# Patient Record
Sex: MALE | Race: WHITE | NOT HISPANIC OR LATINO | Employment: FULL TIME | RURAL
[De-identification: names, ages, dates, MRNs, and addresses within clinical notes are randomized per-mention and may not be internally consistent; named-entity substitution may affect disease eponyms.]

---

## 2020-01-31 ENCOUNTER — OFFICE VISIT (OUTPATIENT)
Dept: FAMILY MEDICINE CLINIC | Facility: CLINIC | Age: 30
End: 2020-01-31

## 2020-01-31 VITALS
OXYGEN SATURATION: 98 % | TEMPERATURE: 98.3 F | HEIGHT: 71 IN | RESPIRATION RATE: 19 BRPM | HEART RATE: 81 BPM | DIASTOLIC BLOOD PRESSURE: 70 MMHG | BODY MASS INDEX: 28.17 KG/M2 | SYSTOLIC BLOOD PRESSURE: 136 MMHG | WEIGHT: 201.2 LBS

## 2020-01-31 DIAGNOSIS — M54.50 LOW BACK PAIN, UNSPECIFIED BACK PAIN LATERALITY, UNSPECIFIED CHRONICITY, UNSPECIFIED WHETHER SCIATICA PRESENT: Primary | ICD-10-CM

## 2020-01-31 PROBLEM — E88.09 HYPERPROTEINEMIA: Status: ACTIVE | Noted: 2020-01-31

## 2020-01-31 PROBLEM — R11.10 VOMITING: Status: ACTIVE | Noted: 2020-01-31

## 2020-01-31 PROBLEM — J01.00 ACUTE NON-RECURRENT MAXILLARY SINUSITIS: Status: ACTIVE | Noted: 2020-01-31

## 2020-01-31 PROBLEM — J10.1 INFLUENZA A: Status: ACTIVE | Noted: 2020-01-31

## 2020-01-31 PROBLEM — F17.200 TOBACCO USE DISORDER: Status: ACTIVE | Noted: 2020-01-31

## 2020-01-31 PROBLEM — R68.89 FLU-LIKE SYMPTOMS: Status: ACTIVE | Noted: 2020-01-31

## 2020-01-31 PROBLEM — J30.89 CHRONIC NONSEASONAL ALLERGIC RHINITIS DUE TO OTHER ALLERGEN: Status: ACTIVE | Noted: 2020-01-31

## 2020-01-31 PROBLEM — J20.9 ACUTE BRONCHITIS: Status: ACTIVE | Noted: 2020-01-31

## 2020-01-31 PROBLEM — J01.90 ACUTE SINUSITIS: Status: ACTIVE | Noted: 2020-01-31

## 2020-01-31 PROBLEM — A09 INFECTIOUS GASTROENTERITIS: Status: ACTIVE | Noted: 2020-01-31

## 2020-01-31 PROBLEM — E83.52 HYPERCALCEMIA: Status: ACTIVE | Noted: 2020-01-31

## 2020-01-31 PROBLEM — E66.3 OVERWEIGHT (BMI 25.0-29.9): Status: ACTIVE | Noted: 2020-01-31

## 2020-01-31 PROBLEM — E66.3 OVERWEIGHT: Status: ACTIVE | Noted: 2020-01-31

## 2020-01-31 PROBLEM — J45.909 ASTHMA: Status: ACTIVE | Noted: 2020-01-31

## 2020-01-31 PROBLEM — E80.6 HYPERBILIRUBINEMIA: Status: ACTIVE | Noted: 2020-01-31

## 2020-01-31 PROCEDURE — 96372 THER/PROPH/DIAG INJ SC/IM: CPT | Performed by: FAMILY MEDICINE

## 2020-01-31 PROCEDURE — 99213 OFFICE O/P EST LOW 20 MIN: CPT | Performed by: FAMILY MEDICINE

## 2020-01-31 RX ORDER — KETOROLAC TROMETHAMINE 30 MG/ML
60 INJECTION, SOLUTION INTRAMUSCULAR; INTRAVENOUS ONCE
Status: COMPLETED | OUTPATIENT
Start: 2020-01-31 | End: 2020-01-31

## 2020-01-31 RX ORDER — METHYLPREDNISOLONE ACETATE 80 MG/ML
80 INJECTION, SUSPENSION INTRA-ARTICULAR; INTRALESIONAL; INTRAMUSCULAR; SOFT TISSUE ONCE
Status: COMPLETED | OUTPATIENT
Start: 2020-01-31 | End: 2020-01-31

## 2020-01-31 RX ORDER — PREDNISONE 20 MG/1
40 TABLET ORAL DAILY
Qty: 10 TABLET | Refills: 0 | Status: SHIPPED | OUTPATIENT
Start: 2020-01-31 | End: 2020-02-05

## 2020-01-31 RX ORDER — DEXAMETHASONE SODIUM PHOSPHATE 10 MG/ML
10 INJECTION INTRAMUSCULAR; INTRAVENOUS ONCE
Status: COMPLETED | OUTPATIENT
Start: 2020-01-31 | End: 2020-01-31

## 2020-01-31 RX ADMIN — DEXAMETHASONE SODIUM PHOSPHATE 10 MG: 10 INJECTION INTRAMUSCULAR; INTRAVENOUS at 15:52

## 2020-01-31 RX ADMIN — KETOROLAC TROMETHAMINE 60 MG: 30 INJECTION, SOLUTION INTRAMUSCULAR; INTRAVENOUS at 15:52

## 2020-01-31 RX ADMIN — METHYLPREDNISOLONE ACETATE 80 MG: 80 INJECTION, SUSPENSION INTRA-ARTICULAR; INTRALESIONAL; INTRAMUSCULAR; SOFT TISSUE at 15:53

## 2020-02-02 NOTE — ASSESSMENT & PLAN NOTE
Patient was given a dexamethasone 10 mg/Depo-Medrol 80 mg IM injection.  He was given a Toradol 60 mg IM injection.  Using a prescription for Voltaren 50 mg and prednisone.  He was advised to use heat and stretch to help his back pain.  He was encouraged to return to clinic if his symptoms do not resolve in the next 6 weeks.

## 2020-07-08 ENCOUNTER — OFFICE VISIT (OUTPATIENT)
Dept: FAMILY MEDICINE CLINIC | Facility: CLINIC | Age: 30
End: 2020-07-08

## 2020-07-08 DIAGNOSIS — R11.2 NAUSEA AND VOMITING IN ADULT: ICD-10-CM

## 2020-07-08 DIAGNOSIS — R10.84 GENERALIZED ABDOMINAL PAIN: Primary | ICD-10-CM

## 2020-07-08 PROCEDURE — 99213 OFFICE O/P EST LOW 20 MIN: CPT | Performed by: FAMILY MEDICINE

## 2020-07-08 RX ORDER — ONDANSETRON 4 MG/1
4 TABLET, FILM COATED ORAL EVERY 8 HOURS PRN
Qty: 30 TABLET | Refills: 2 | Status: SHIPPED | OUTPATIENT
Start: 2020-07-08 | End: 2021-10-08

## 2020-07-08 NOTE — ASSESSMENT & PLAN NOTE
Patient was prescribed ondansetron to treat his symptoms of nausea and vomiting.  Patient was encouraged return to clinic in 1 month for follow-up.

## 2020-07-08 NOTE — PROGRESS NOTES
Chief Complaint   Patient presents with   • Abdominal Pain   • Nausea     Video visit    History of Present Illness:  Subjective   Sheldon Thapa is a 29 y.o. male.   Abdominal Pain   This is a new problem. The current episode started 1 to 4 weeks ago. The onset quality is sudden. The problem occurs daily. The problem has been gradually worsening. The pain is located in the generalized abdominal region. The pain is at a severity of 5/10. The pain is mild. The quality of the pain is aching, colicky, a sensation of fullness and dull. The abdominal pain does not radiate. Associated symptoms include constipation, diarrhea, nausea and vomiting. Pertinent negatives include no anorexia, arthralgias, belching, dysuria, fever, flatus, frequency, headaches, hematochezia, hematuria, melena, myalgias or weight loss. Associated symptoms comments: Patient states that he has had some stomach bloating and stomach pains for about a week now. He states that he really has not had a normal bowel movement for a week either. He states he has been very nauseated and he has had some vomiting. He reports no fevers. He states that he has not changed a lot in his diet. He states that he has had the vomiting for 2-3 days now. . The pain is aggravated by eating and movement. The pain is relieved by nothing. The treatment provided no relief. There is no history of abdominal surgery, colon cancer, Crohn's disease, gallstones, GERD, irritable bowel syndrome, pancreatitis, PUD or ulcerative colitis.        Allergies:  No Known Allergies    Social History:  Social History     Socioeconomic History   • Marital status:      Spouse name: Not on file   • Number of children: Not on file   • Years of education: Not on file   • Highest education level: Not on file   Tobacco Use   • Smoking status: Current Some Day Smoker   • Smokeless tobacco: Current User     Types: Chew   • Tobacco comment: Smokes when drinking, 1 pack per week. Started age 15 til  present. Chews 2 cans a week.   Substance and Sexual Activity   • Alcohol use: Yes     Comment: Occasional alcohol use. 2 beers daily.       Family History:  Family History   Problem Relation Age of Onset   • Hyperlipidemia Mother    • Hypertension Father    • Lupus Father         Lupus Erythematosus,Systemic   • Hyperlipidemia Father    • Heart disease Maternal Grandmother         Ischemic,Cardiovascular   • Diabetes Maternal Grandmother    • Diabetes Maternal Grandfather    • Colon cancer Maternal Grandfather    • Lung cancer Paternal Grandfather    • Diabetes Maternal Uncle        Past Medical History :  Active Ambulatory Problems     Diagnosis Date Noted   • Acute bronchitis 01/31/2020   • Acute non-recurrent maxillary sinusitis 01/31/2020   • Acute sinusitis 01/31/2020   • Asthma 01/31/2020   • Chronic nonseasonal allergic rhinitis due to other allergen 01/31/2020   • Flu-like symptoms 01/31/2020   • Tobacco use disorder 01/31/2020   • Hyperbilirubinemia 01/31/2020   • Hypercalcemia 01/31/2020   • Hyperproteinemia 01/31/2020   • Infectious gastroenteritis 01/31/2020   • Overweight (BMI 25.0-29.9) 01/31/2020   • Influenza A 01/31/2020   • Overweight 01/31/2020   • Nausea and vomiting in adult 01/31/2020   • Low back pain 01/31/2020   • Generalized abdominal pain 07/08/2020     Resolved Ambulatory Problems     Diagnosis Date Noted   • No Resolved Ambulatory Problems     Past Medical History:   Diagnosis Date   • Chronic non-seasonal allergic rhinitis    • Gastroenteritis, infectious    • Upper respiratory infection, acute    • Vomiting        Medication List:  Outpatient Encounter Medications as of 7/8/2020   Medication Sig Dispense Refill   • diclofenac (VOLTAREN) 50 MG EC tablet Take 1 tablet by mouth 2 (Two) Times a Day. 60 tablet 2   • ondansetron (ZOFRAN) 4 MG tablet Take 1 tablet by mouth Every 8 (Eight) Hours As Needed for Nausea or Vomiting. 30 tablet 2     No facility-administered encounter medications  on file as of 7/8/2020.        Past Surgical History:  Past Surgical History:   Procedure Laterality Date   • HAND SURGERY Left 2009   • TONSILLECTOMY AND ADENOIDECTOMY          The following portions of the patient's history were reviewed and updated as appropriate: allergies, current medications, past family history, past medical history, past social history, past surgical history and problem list.    Review Of Systems:  Review of Systems   Constitutional: Negative for activity change, appetite change, fatigue, fever and unexpected weight loss.   HENT: Negative for ear discharge, ear pain, postnasal drip and rhinorrhea.    Eyes: Negative for double vision and discharge.   Respiratory: Negative for cough, chest tightness and shortness of breath.    Cardiovascular: Negative for chest pain.   Gastrointestinal: Positive for abdominal pain, constipation, diarrhea, nausea and vomiting. Negative for anorexia, flatus, hematochezia and melena.   Endocrine: Negative for cold intolerance and heat intolerance.   Genitourinary: Negative for dysuria, frequency and hematuria.   Musculoskeletal: Negative for arthralgias, back pain, gait problem, joint swelling and myalgias.   Skin: Negative for color change and rash.   Neurological: Negative for dizziness, facial asymmetry and confusion.   Psychiatric/Behavioral: Negative for behavioral problems.       Objective     Physical Exam:  Vital Signs:  There were no vitals taken for this visit.    Physical Exam   Constitutional: He is oriented to person, place, and time. He appears well-developed.   Neurological: He is alert and oriented to person, place, and time.       Assessment/Plan   Assessment and Plan:  Diagnoses and all orders for this visit:    1. Generalized abdominal pain (Primary)  Assessment & Plan:  This is mild abdominal discomfort from the nausea and vomiting.  Patient also reports having bloating as well.  Due to this patient was advised to use probiotics and/or  activity yogurt to help with his symptoms.  Patient was encouraged to return to clinic in 1 month if his symptoms do not improve.      2. Nausea and vomiting in adult  Assessment & Plan:  Patient was prescribed ondansetron to treat his symptoms of nausea and vomiting.  Patient was encouraged return to clinic in 1 month for follow-up.    Orders:  -     ondansetron (ZOFRAN) 4 MG tablet; Take 1 tablet by mouth Every 8 (Eight) Hours As Needed for Nausea or Vomiting.  Dispense: 30 tablet; Refill: 2    The use of a video visit has been reviewed with the patient and verbal informed consent has been obtained.       Spent 12 minutes with patient and greater than 50% of visit spent on counseling and coordination of care regarding the patient's illness, along with the pros and cons of various treatment options.

## 2020-07-08 NOTE — ASSESSMENT & PLAN NOTE
This is mild abdominal discomfort from the nausea and vomiting.  Patient also reports having bloating as well.  Due to this patient was advised to use probiotics and/or activity yogurt to help with his symptoms.  Patient was encouraged to return to clinic in 1 month if his symptoms do not improve.

## 2020-09-28 ENCOUNTER — OFFICE VISIT (OUTPATIENT)
Dept: FAMILY MEDICINE CLINIC | Facility: CLINIC | Age: 30
End: 2020-09-28

## 2020-09-28 VITALS
SYSTOLIC BLOOD PRESSURE: 161 MMHG | BODY MASS INDEX: 27.66 KG/M2 | DIASTOLIC BLOOD PRESSURE: 101 MMHG | TEMPERATURE: 97.5 F | WEIGHT: 197.6 LBS | HEIGHT: 71 IN | RESPIRATION RATE: 18 BRPM | HEART RATE: 69 BPM | OXYGEN SATURATION: 97 %

## 2020-09-28 DIAGNOSIS — I10 ESSENTIAL HYPERTENSION: Primary | ICD-10-CM

## 2020-09-28 DIAGNOSIS — J02.0 STREP THROAT: ICD-10-CM

## 2020-09-28 PROCEDURE — 99214 OFFICE O/P EST MOD 30 MIN: CPT | Performed by: FAMILY MEDICINE

## 2020-09-28 RX ORDER — AMOXICILLIN AND CLAVULANATE POTASSIUM 500; 125 MG/1; MG/1
1 TABLET, FILM COATED ORAL 2 TIMES DAILY
Qty: 20 TABLET | Refills: 0 | Status: SHIPPED | OUTPATIENT
Start: 2020-09-28 | End: 2020-10-08

## 2020-09-28 RX ORDER — HYDROCHLOROTHIAZIDE 25 MG/1
25 TABLET ORAL DAILY
Qty: 30 TABLET | Refills: 2 | Status: SHIPPED | OUTPATIENT
Start: 2020-09-28 | End: 2021-10-08

## 2020-09-28 NOTE — ASSESSMENT & PLAN NOTE
Hypertension is worsening.  Continue current treatment regimen.  Dietary sodium restriction.  Weight loss.  Regular aerobic exercise.  Blood pressure will be reassessed in 4 weeks.  Patient was started on hydrochlorothiazide to treat her symptoms.

## 2020-09-28 NOTE — PROGRESS NOTES
Chief Complaint   Patient presents with   • Sinusitis   • Hypertension       History of Present Illness:  Subjective   Sheldon Thapa is a 30 y.o. male.   Sinusitis  This is a new problem. The current episode started in the past 7 days (started last tuesday ). The problem has been gradually worsening since onset. Maximum temperature: 99.0 on tuesday night and wednesday night  His pain is at a severity of 5/10. The pain is moderate. Associated symptoms include chills, congestion, ear pain (Left ear ), headaches, a hoarse voice and a sore throat (he states that he feels his throat is swollen ). Pertinent negatives include no coughing, diaphoresis, neck pain, shortness of breath, sinus pressure, sneezing or swollen glands. (Patient states that he has been having sinus issues since last tuesday and he states that he has been taking sudafed to treat. ) Past treatments include nothing. The treatment provided no relief.   Hypertension  This is a new problem. The current episode started today. The problem is unchanged. The problem is uncontrolled. Associated symptoms include headaches. Pertinent negatives include no anxiety, blurred vision, chest pain, malaise/fatigue, neck pain, orthopnea, palpitations, peripheral edema, PND, shortness of breath or sweats. Past treatments include nothing. Current antihypertension treatment includes nothing. The current treatment provides no improvement. There are no compliance problems.  There is no history of angina, kidney disease, CAD/MI, CVA, heart failure, left ventricular hypertrophy, PVD or retinopathy. There is no history of chronic renal disease, coarctation of the aorta, hyperaldosteronism, hypercortisolism, hyperparathyroidism, a hypertension causing med, pheochromocytoma, renovascular disease, sleep apnea or a thyroid problem.        Allergies:  No Known Allergies    Social History:  Social History     Socioeconomic History   • Marital status:      Spouse name: Not on  file   • Number of children: Not on file   • Years of education: Not on file   • Highest education level: Not on file   Tobacco Use   • Smoking status: Current Some Day Smoker   • Smokeless tobacco: Current User     Types: Chew   • Tobacco comment: Smokes when drinking, 1 pack per week. Started age 15 til present. Chews 2 cans a week.   Substance and Sexual Activity   • Alcohol use: Yes     Comment: Occasional alcohol use. 2 beers daily.       Family History:  Family History   Problem Relation Age of Onset   • Hyperlipidemia Mother    • Hypertension Father    • Lupus Father         Lupus Erythematosus,Systemic   • Hyperlipidemia Father    • Heart disease Maternal Grandmother         Ischemic,Cardiovascular   • Diabetes Maternal Grandmother    • Diabetes Maternal Grandfather    • Colon cancer Maternal Grandfather    • Lung cancer Paternal Grandfather    • Diabetes Maternal Uncle        Past Medical History :  Active Ambulatory Problems     Diagnosis Date Noted   • Acute bronchitis 01/31/2020   • Acute non-recurrent maxillary sinusitis 01/31/2020   • Acute sinusitis 01/31/2020   • Asthma 01/31/2020   • Chronic nonseasonal allergic rhinitis due to other allergen 01/31/2020   • Flu-like symptoms 01/31/2020   • Tobacco use disorder 01/31/2020   • Hyperbilirubinemia 01/31/2020   • Hypercalcemia 01/31/2020   • Hyperproteinemia 01/31/2020   • Infectious gastroenteritis 01/31/2020   • Overweight (BMI 25.0-29.9) 01/31/2020   • Influenza A 01/31/2020   • Overweight 01/31/2020   • Nausea and vomiting in adult 01/31/2020   • Low back pain 01/31/2020   • Generalized abdominal pain 07/08/2020   • Essential hypertension 09/28/2020     Resolved Ambulatory Problems     Diagnosis Date Noted   • No Resolved Ambulatory Problems     Past Medical History:   Diagnosis Date   • Chronic non-seasonal allergic rhinitis    • Gastroenteritis, infectious    • Upper respiratory infection, acute    • Vomiting        Medication List:  Outpatient  Encounter Medications as of 9/28/2020   Medication Sig Dispense Refill   • amoxicillin-clavulanate (Augmentin) 500-125 MG per tablet Take 1 tablet by mouth 2 (two) times a day for 10 days. 20 tablet 0   • diclofenac (VOLTAREN) 50 MG EC tablet Take 1 tablet by mouth 2 (Two) Times a Day. 60 tablet 2   • hydroCHLOROthiazide (HYDRODIURIL) 25 MG tablet Take 1 tablet by mouth Daily. 30 tablet 2   • ondansetron (ZOFRAN) 4 MG tablet Take 1 tablet by mouth Every 8 (Eight) Hours As Needed for Nausea or Vomiting. 30 tablet 2     No facility-administered encounter medications on file as of 9/28/2020.        Past Surgical History:  Past Surgical History:   Procedure Laterality Date   • HAND SURGERY Left 2009   • TONSILLECTOMY AND ADENOIDECTOMY          The following portions of the patient's history were reviewed and updated as appropriate: allergies, current medications, past family history, past medical history, past social history, past surgical history and problem list.    Review Of Systems:  Review of Systems   Constitutional: Positive for chills. Negative for activity change, appetite change, diaphoresis, fatigue and malaise/fatigue.   HENT: Positive for congestion, ear pain (Left ear ), hoarse voice and sore throat (he states that he feels his throat is swollen ). Negative for ear discharge, postnasal drip, rhinorrhea, sinus pressure, sneezing and swollen glands.    Eyes: Negative for blurred vision, double vision and discharge.   Respiratory: Negative for cough, chest tightness and shortness of breath.    Cardiovascular: Negative for chest pain, palpitations, orthopnea and PND.   Endocrine: Negative for cold intolerance and heat intolerance.   Musculoskeletal: Negative for back pain, gait problem, joint swelling and neck pain.   Skin: Negative for color change and rash.   Neurological: Negative for dizziness, facial asymmetry and confusion.   Psychiatric/Behavioral: Negative for behavioral problems.       Objective    "  Physical Exam:  Vital Signs:  Visit Vitals  BP (!) 161/101   Pulse 69   Temp 97.5 °F (36.4 °C)   Resp 18   Ht 179.1 cm (70.5\")   Wt 89.6 kg (197 lb 9.6 oz)   SpO2 97%   BMI 27.95 kg/m²       Physical Exam  Vitals signs reviewed.   Constitutional:       Appearance: He is well-developed.   HENT:      Head: Normocephalic.      Right Ear: External ear normal.      Left Ear: External ear normal.      Nose: Nose normal.      Mouth/Throat:      Mouth: Mucous membranes are moist.      Tongue: No lesions.      Palate: No mass and lesions.      Pharynx: Pharyngeal swelling and posterior oropharyngeal erythema present.   Eyes:      Conjunctiva/sclera: Conjunctivae normal.   Neck:      Musculoskeletal: Normal range of motion and neck supple.   Cardiovascular:      Rate and Rhythm: Normal rate and regular rhythm.   Pulmonary:      Effort: Pulmonary effort is normal.      Breath sounds: Normal breath sounds.   Musculoskeletal: Normal range of motion.   Skin:     General: Skin is warm and dry.      Capillary Refill: Capillary refill takes less than 2 seconds.   Neurological:      Mental Status: He is alert and oriented to person, place, and time.           Assessment/Plan   Assessment and Plan:  Diagnoses and all orders for this visit:    1. Essential hypertension (Primary)  Assessment & Plan:  Hypertension is worsening.  Continue current treatment regimen.  Dietary sodium restriction.  Weight loss.  Regular aerobic exercise.  Blood pressure will be reassessed in 4 weeks.  Patient was started on hydrochlorothiazide to treat her symptoms.    Orders:  -     hydroCHLOROthiazide (HYDRODIURIL) 25 MG tablet; Take 1 tablet by mouth Daily.  Dispense: 30 tablet; Refill: 2    2. Strep throat  Assessment & Plan:  he was prescribed Augmentin to treat his symptoms.    Increase fluids. Tylenol/motrin for pain or fever.   Medication and medication adverse effects discussed.    Follow-up 5-7 days for reevaluation if not improved or sooner if " needed.    Orders:  -     amoxicillin-clavulanate (Augmentin) 500-125 MG per tablet; Take 1 tablet by mouth 2 (two) times a day for 10 days.  Dispense: 20 tablet; Refill: 0

## 2020-09-28 NOTE — ASSESSMENT & PLAN NOTE
he was prescribed Augmentin to treat his symptoms.    Increase fluids. Tylenol/motrin for pain or fever.   Medication and medication adverse effects discussed.    Follow-up 5-7 days for reevaluation if not improved or sooner if needed.

## 2021-06-28 ENCOUNTER — OFFICE VISIT (OUTPATIENT)
Dept: FAMILY MEDICINE CLINIC | Facility: CLINIC | Age: 31
End: 2021-06-28

## 2021-06-28 VITALS
WEIGHT: 206 LBS | HEART RATE: 86 BPM | SYSTOLIC BLOOD PRESSURE: 161 MMHG | DIASTOLIC BLOOD PRESSURE: 113 MMHG | RESPIRATION RATE: 18 BRPM | OXYGEN SATURATION: 98 % | TEMPERATURE: 98.9 F | HEIGHT: 71 IN | BODY MASS INDEX: 28.84 KG/M2

## 2021-06-28 DIAGNOSIS — I10 ESSENTIAL HYPERTENSION: ICD-10-CM

## 2021-06-28 DIAGNOSIS — L23.7 ALLERGIC CONTACT DERMATITIS DUE TO PLANTS, EXCEPT FOOD: Primary | ICD-10-CM

## 2021-06-28 PROCEDURE — 99214 OFFICE O/P EST MOD 30 MIN: CPT | Performed by: FAMILY MEDICINE

## 2021-06-28 PROCEDURE — 96372 THER/PROPH/DIAG INJ SC/IM: CPT | Performed by: FAMILY MEDICINE

## 2021-06-28 RX ORDER — PREDNISONE 20 MG/1
20 TABLET ORAL DAILY
Qty: 20 TABLET | Refills: 0 | Status: SHIPPED | OUTPATIENT
Start: 2021-06-28 | End: 2021-07-11

## 2021-06-28 RX ORDER — METHYLPREDNISOLONE ACETATE 80 MG/ML
80 INJECTION, SUSPENSION INTRA-ARTICULAR; INTRALESIONAL; INTRAMUSCULAR; SOFT TISSUE ONCE
Status: COMPLETED | OUTPATIENT
Start: 2021-06-28 | End: 2021-06-28

## 2021-06-28 RX ORDER — METHYLPREDNISOLONE ACETATE 40 MG/ML
80 INJECTION, SUSPENSION INTRA-ARTICULAR; INTRALESIONAL; INTRAMUSCULAR; SOFT TISSUE ONCE
Status: DISCONTINUED | OUTPATIENT
Start: 2021-06-28 | End: 2021-06-28

## 2021-06-28 RX ADMIN — METHYLPREDNISOLONE ACETATE 80 MG: 80 INJECTION, SUSPENSION INTRA-ARTICULAR; INTRALESIONAL; INTRAMUSCULAR; SOFT TISSUE at 15:30

## 2021-06-28 NOTE — PROGRESS NOTES
Subjective   Sheldon Thapa is a 30 y.o. male.   Chief Complaint   Patient presents with   • Rash       Rash  This is a new problem. The current episode started 1 to 4 weeks ago. The affected locations include the left arm, right arm, left lower leg and right lower leg. The rash is characterized by itchiness, redness and pain. He was exposed to plant contact. Pertinent negatives include no diarrhea, fatigue, fever, shortness of breath or vomiting. Past treatments include anti-itch cream. The treatment provided no relief.        The following portions of the patient's history were reviewed and updated as appropriate: allergies, current medications, past family history, past medical history, past social history, past surgical history and problem list.    Patient Active Problem List   Diagnosis   • Acute bronchitis   • Acute non-recurrent maxillary sinusitis   • Acute sinusitis   • Asthma   • Chronic nonseasonal allergic rhinitis due to other allergen   • Flu-like symptoms   • Tobacco use disorder   • Hyperbilirubinemia   • Hypercalcemia   • Hyperproteinemia   • Infectious gastroenteritis   • Overweight (BMI 25.0-29.9)   • Influenza A   • Overweight   • Nausea and vomiting in adult   • Low back pain   • Generalized abdominal pain   • Essential hypertension       Current Outpatient Medications on File Prior to Visit   Medication Sig Dispense Refill   • diclofenac (VOLTAREN) 50 MG EC tablet Take 1 tablet by mouth 2 (Two) Times a Day. 60 tablet 2   • hydroCHLOROthiazide (HYDRODIURIL) 25 MG tablet Take 1 tablet by mouth Daily. 30 tablet 2   • ondansetron (ZOFRAN) 4 MG tablet Take 1 tablet by mouth Every 8 (Eight) Hours As Needed for Nausea or Vomiting. 30 tablet 2     No current facility-administered medications on file prior to visit.     Current outpatient and discharge medications have been reconciled for the patient.  Reviewed by: Osvaldo Pierce MD      No Known Allergies    Review of Systems   Constitutional:  "Negative for activity change, appetite change, fatigue and fever.   HENT: Negative for ear pain, swollen glands and voice change.    Eyes: Negative for visual disturbance.   Respiratory: Negative for shortness of breath and wheezing.    Cardiovascular: Negative for chest pain and leg swelling.   Gastrointestinal: Negative for abdominal pain, blood in stool, constipation, diarrhea, nausea and vomiting.   Endocrine: Negative for polydipsia and polyuria.   Genitourinary: Negative for dysuria, frequency and hematuria.   Musculoskeletal: Negative for joint swelling, neck pain and neck stiffness.   Skin: Positive for rash. Negative for bruise.   Neurological: Negative for weakness, numbness and headache.   Psychiatric/Behavioral: Negative for suicidal ideas and depressed mood.     I have reviewed and confirmed the accuracy of the ROS as documented by the MA/LPN/RN Osvaldo Pierce MD    Objective   Visit Vitals  BP (!) 161/113 (BP Location: Right arm, Patient Position: Sitting, Cuff Size: Adult)   Pulse 86   Temp 98.9 °F (37.2 °C)   Resp 18   Ht 179.1 cm (70.5\")   Wt 93.4 kg (206 lb)   SpO2 98%   BMI 29.14 kg/m²       Physical Exam  Constitutional:       Appearance: He is well-developed.   HENT:      Head: Normocephalic and atraumatic.      Right Ear: External ear normal.      Left Ear: External ear normal.      Nose: Nose normal.   Eyes:      Pupils: Pupils are equal, round, and reactive to light.   Cardiovascular:      Rate and Rhythm: Normal rate and regular rhythm.      Heart sounds: Normal heart sounds.   Pulmonary:      Effort: Pulmonary effort is normal.      Breath sounds: Normal breath sounds.   Abdominal:      General: Bowel sounds are normal.      Palpations: Abdomen is soft.   Musculoskeletal:         General: Normal range of motion.      Cervical back: Normal range of motion and neck supple.   Skin:     General: Skin is warm and dry.      Comments: Significant erythema and vesicles right trunk c/w " contact derm, likely poison ivy    Neurological:      Mental Status: He is alert and oriented to person, place, and time.   Psychiatric:         Behavior: Behavior normal.         Thought Content: Thought content normal.         Judgment: Judgment normal.         Assessment/Plan .    Diagnoses and all orders for this visit:    1. Allergic contact dermatitis due to plants, except food (Primary)  -     Discontinue: methylPREDNISolone acetate (DEPO-medrol) injection 80 mg  -     predniSONE (DELTASONE) 20 MG tablet; Take 1 tablet by mouth Daily for 13 days. TID x 3 days, BID x 3 days, QD x 3 days, 1/2 tab daily x 4 days  Dispense: 20 tablet; Refill: 0  -     methylPREDNISolone acetate (DEPO-medrol) injection 80 mg    2. Essential hypertension  Assessment & Plan:  BP noted. Follow up with PMD in 2-3 days for reeval      Findings discussed. All questions answered..mmed  Follow-up in approximately 3 days for reevaluation if not improved.  Follow-up sooner for worsening symptoms or any concerns.  Follow-up for routine health maintenance as directed       I wore protective equipment throughout this patient encounter to include mask and gloves. Hand hygiene was performed before donning protective equipment and after removal when leaving the room

## 2021-09-08 ENCOUNTER — OFFICE VISIT (OUTPATIENT)
Dept: FAMILY MEDICINE CLINIC | Facility: CLINIC | Age: 31
End: 2021-09-08

## 2021-09-08 VITALS
BODY MASS INDEX: 28.42 KG/M2 | HEIGHT: 71 IN | DIASTOLIC BLOOD PRESSURE: 110 MMHG | OXYGEN SATURATION: 99 % | WEIGHT: 203 LBS | RESPIRATION RATE: 18 BRPM | SYSTOLIC BLOOD PRESSURE: 160 MMHG | TEMPERATURE: 97.1 F | HEART RATE: 64 BPM

## 2021-09-08 DIAGNOSIS — E66.3 OVERWEIGHT (BMI 25.0-29.9): ICD-10-CM

## 2021-09-08 DIAGNOSIS — F17.200 TOBACCO USE DISORDER: ICD-10-CM

## 2021-09-08 DIAGNOSIS — J06.9 ACUTE URI: Primary | ICD-10-CM

## 2021-09-08 DIAGNOSIS — Z20.822 EXPOSURE TO COVID-19 VIRUS: ICD-10-CM

## 2021-09-08 DIAGNOSIS — I10 ESSENTIAL HYPERTENSION: ICD-10-CM

## 2021-09-08 PROCEDURE — 99214 OFFICE O/P EST MOD 30 MIN: CPT | Performed by: FAMILY MEDICINE

## 2021-09-08 RX ORDER — PREDNISONE 1 MG/1
5 TABLET ORAL DAILY
Qty: 45 TABLET | Refills: 0 | Status: SHIPPED | OUTPATIENT
Start: 2021-09-08 | End: 2021-10-08

## 2021-09-08 RX ORDER — AZITHROMYCIN 250 MG/1
TABLET, FILM COATED ORAL
Qty: 6 TABLET | Refills: 0 | Status: SHIPPED | OUTPATIENT
Start: 2021-09-08 | End: 2021-10-08

## 2021-09-08 RX ORDER — LISINOPRIL 20 MG/1
20 TABLET ORAL DAILY
Qty: 30 TABLET | Refills: 2 | Status: SHIPPED | OUTPATIENT
Start: 2021-09-08

## 2021-09-08 NOTE — PROGRESS NOTES
Subjective   Sheldon Thapa is a 31 y.o. male.     Chief Complaint   Patient presents with   • URI   • Hypertension       URI   This is a new problem. The current episode started in the past 7 days. The problem has been unchanged. There has been no fever. Associated symptoms include congestion, coughing, headaches, sinus pain and wheezing. Pertinent negatives include no abdominal pain, chest pain, diarrhea, dysuria, ear pain, joint pain, joint swelling, nausea, neck pain, plugged ear sensation, rash, rhinorrhea, sneezing, sore throat, swollen glands or vomiting. He has tried decongestant and antihistamine for the symptoms. The treatment provided mild relief.   Hypertension  This is a chronic problem. The current episode started more than 1 year ago. The problem has been gradually worsening since onset. Associated symptoms include headaches. Pertinent negatives include no chest pain, malaise/fatigue, neck pain, palpitations or shortness of breath. Agents associated with hypertension include decongestants. Risk factors for coronary artery disease include male gender and smoking/tobacco exposure. Past treatments include diuretics. Current antihypertension treatment includes nothing. The current treatment provides no improvement.            I personally reviewed and updated the patient's allergies, medications, problem list, and past medical, surgical, social, and family history. I have reviewed and confirmed the accuracy of the History of Present Illness and Review of Symptoms as documented by the MA/CHEON/RN. Farhat Cohn MD    Family History   Problem Relation Age of Onset   • Hyperlipidemia Mother    • Hypertension Father    • Lupus Father         Lupus Erythematosus,Systemic   • Hyperlipidemia Father    • Heart disease Maternal Grandmother         Ischemic,Cardiovascular   • Diabetes Maternal Grandmother    • Diabetes Maternal Grandfather    • Colon cancer Maternal Grandfather    • Lung cancer Paternal Grandfather     • Diabetes Maternal Uncle        Social History     Tobacco Use   • Smoking status: Current Some Day Smoker   • Smokeless tobacco: Current User     Types: Chew   • Tobacco comment: Smokes when drinking, 1 pack per week. Started age 15 til present. Chews 2 cans a week.   Vaping Use   • Vaping Use: Never used   Substance Use Topics   • Alcohol use: Yes     Comment: Occasional alcohol use. 2 beers daily.   • Drug use: Not on file       Past Surgical History:   Procedure Laterality Date   • HAND SURGERY Left 2009   • TONSILLECTOMY AND ADENOIDECTOMY         Patient Active Problem List   Diagnosis   • Acute bronchitis   • Acute non-recurrent maxillary sinusitis   • Acute sinusitis   • Asthma   • Chronic nonseasonal allergic rhinitis due to other allergen   • Flu-like symptoms   • Tobacco use disorder   • Hyperbilirubinemia   • Hypercalcemia   • Hyperproteinemia   • Infectious gastroenteritis   • Overweight (BMI 25.0-29.9)   • Influenza A   • Overweight   • Nausea and vomiting in adult   • Low back pain   • Generalized abdominal pain   • Essential hypertension         Current Outpatient Medications:   •  azithromycin (Zithromax Z-Brock) 250 MG tablet, Take 2 tablets by mouth on day 1, then 1 tablet daily on days 2-5, Disp: 6 tablet, Rfl: 0  •  diclofenac (VOLTAREN) 50 MG EC tablet, Take 1 tablet by mouth 2 (Two) Times a Day., Disp: 60 tablet, Rfl: 2  •  hydroCHLOROthiazide (HYDRODIURIL) 25 MG tablet, Take 1 tablet by mouth Daily., Disp: 30 tablet, Rfl: 2  •  lisinopril (PRINIVIL,ZESTRIL) 20 MG tablet, Take 1 tablet by mouth Daily., Disp: 30 tablet, Rfl: 2  •  ondansetron (ZOFRAN) 4 MG tablet, Take 1 tablet by mouth Every 8 (Eight) Hours As Needed for Nausea or Vomiting., Disp: 30 tablet, Rfl: 2  •  predniSONE (DELTASONE) 5 MG tablet, Take 1 tablet by mouth Daily. 40mg x 3 days, 20mg x 3 days, 10mg x 3 days, 5mg x 3 days, Disp: 45 tablet, Rfl: 0         Review of Systems   Constitutional: Negative for chills, diaphoresis  "and malaise/fatigue.   HENT: Positive for congestion. Negative for ear pain, rhinorrhea, sneezing, sore throat and swollen glands.    Eyes: Negative for visual disturbance.   Respiratory: Positive for cough and wheezing. Negative for shortness of breath.    Cardiovascular: Negative for chest pain and palpitations.   Gastrointestinal: Negative for abdominal pain, diarrhea, nausea and vomiting.   Endocrine: Negative for polydipsia and polyphagia.   Genitourinary: Negative for dysuria.   Musculoskeletal: Negative for joint pain, neck pain and neck stiffness.   Skin: Negative for color change, pallor and rash.   Neurological: Negative for seizures and syncope.   Hematological: Negative for adenopathy.       I have reviewed and confirmed the accuracy of the ROS as documented by the MA/LPN/RN Farhat Cohn MD      Objective   BP (!) 160/110   Pulse 64   Temp 97.1 °F (36.2 °C)   Resp 18   Ht 179.1 cm (70.5\")   Wt 92.1 kg (203 lb)   SpO2 99%   BMI 28.72 kg/m²   BP Readings from Last 3 Encounters:   09/08/21 (!) 160/110   06/28/21 (!) 161/113   09/28/20 (!) 161/101     Wt Readings from Last 3 Encounters:   09/08/21 92.1 kg (203 lb)   06/28/21 93.4 kg (206 lb)   09/28/20 89.6 kg (197 lb 9.6 oz)     Physical Exam  Constitutional:       Appearance: Normal appearance. He is well-developed. He is not diaphoretic.   HENT:      Head: Normocephalic.      Right Ear: Hearing, ear canal and external ear normal. A middle ear effusion is present. Tympanic membrane is erythematous.      Left Ear: Hearing, ear canal and external ear normal. A middle ear effusion is present. Tympanic membrane is erythematous.      Nose: Congestion present.      Right Sinus: Maxillary sinus tenderness and frontal sinus tenderness present.      Left Sinus: Maxillary sinus tenderness and frontal sinus tenderness present.      Mouth/Throat:      Pharynx: Posterior oropharyngeal erythema present.      Tonsils: No tonsillar abscesses. 1+ on the right. 1+ " on the left.   Eyes:      General: Lids are normal.      Conjunctiva/sclera: Conjunctivae normal.      Pupils: Pupils are equal, round, and reactive to light.   Neck:      Meningeal: Brudzinski's sign and Kernig's sign absent.   Cardiovascular:      Rate and Rhythm: Normal rate and regular rhythm.      Pulses: Normal pulses.      Heart sounds: Normal heart sounds, S1 normal and S2 normal. No murmur heard.   No friction rub. No gallop.    Pulmonary:      Effort: Pulmonary effort is normal. No accessory muscle usage or respiratory distress.      Breath sounds: No stridor. Examination of the right-upper field reveals wheezing and rhonchi. Examination of the left-upper field reveals wheezing and rhonchi. Examination of the right-middle field reveals wheezing and rhonchi. Examination of the left-middle field reveals wheezing and rhonchi. Examination of the right-lower field reveals wheezing and rhonchi. Examination of the left-lower field reveals wheezing and rhonchi. Wheezing and rhonchi present. No decreased breath sounds or rales.   Abdominal:      General: Bowel sounds are normal. There is no distension.      Palpations: Abdomen is soft. There is no mass.      Tenderness: There is no abdominal tenderness.      Hernia: No hernia is present.   Skin:     General: Skin is warm and dry.      Coloration: Skin is not pale.   Neurological:      Mental Status: He is alert and oriented to person, place, and time.      Cranial Nerves: No cranial nerve deficit.      Coordination: Coordination normal.      Gait: Gait normal.         Data / Lab Results:    No results found for: HGBA1C     No results found for: LDL, LDLDIRECT  No results found for: CHOL  No results found for: TRIG  No results found for: HDL  No results found for: PSA  No results found for: WBC, HGB, HCT, MCV, PLT  No results found for: TSH, G1AFJIM, K2ANMOD   No results found for: GLUCOSE, BUN, CREATININE, EGFRIFNONA, EGFRIFAFRI, BCR, K, CO2, CALCIUM, PROTENTOTREF,  ALBUMIN, LABIL2, BILIRUBIN, AST, ALT  No results found for: RIGOBERTO, RF, SEDRATE   No results found for: CRP   No results found for: IRON, TIBC, FERRITIN   No results found for: [id number]       Assessment/Plan      Medications        Problem List         LOS    Hypertension.  Persistent elevation today, did not tolerate hydrochlorothiazide.  Start lisinopril.  Importance of close follow-up discussed, follow-up visit scheduled in 1 month.  Discussed low-sodium diet.  Stop smoking.  Call return if worsening symptoms.  Upper respiratory infection.  DDX includes acute bacterial bronchitis versus COVID-19.  Rapid test initially negative, repeat Covid swab pending.  Quarantine.  Start antibiotics/steroids.  Increase fluid intake.  Call return if fever, respiratory difficulty, worsening symptoms.  Tobacco use.  Encourage cessation.    Diagnoses and all orders for this visit:    1. Acute URI (Primary)  -     COVID-19,[facility] ROUTINE, NP/OP SWAB IN TRANSPORT MEDIA OR ESWAB 72 HR TAT - Swab, Nasopharynx  -     azithromycin (Zithromax Z-Brock) 250 MG tablet; Take 2 tablets by mouth on day 1, then 1 tablet daily on days 2-5  Dispense: 6 tablet; Refill: 0  -     predniSONE (DELTASONE) 5 MG tablet; Take 1 tablet by mouth Daily. 40mg x 3 days, 20mg x 3 days, 10mg x 3 days, 5mg x 3 days  Dispense: 45 tablet; Refill: 0    2. Tobacco use disorder    3. Overweight (BMI 25.0-29.9)    4. Exposure to COVID-19 virus  -     COVID-19,[facility] ROUTINE, NP/OP SWAB IN TRANSPORT MEDIA OR ESWAB 72 HR TAT - Swab, Nasopharynx    5. Essential hypertension  -     lisinopril (PRINIVIL,ZESTRIL) 20 MG tablet; Take 1 tablet by mouth Daily.  Dispense: 30 tablet; Refill: 2              Expected course, medications, and adverse effects discussed.  Call or return if worsening or persistent symptoms.  I wore protective equipment throughout this patient encounter including a mask, gloves, and eye protection.  Hand hygiene was performed before donning protective  equipment and after removal when leaving the room. The complete contents of the Assessment and Plan and Data/Lab Results as documented above have been reviewed and addressed by myself with the patient today as part of an ongoing evaluation / treatment plan.  If some of the documentation has been copied from a previous note and is unchanged it indicates that this problem / plan has been assessed today but is stable from a previous visit and no changes have been recommended.

## 2021-09-10 ENCOUNTER — TELEPHONE (OUTPATIENT)
Dept: FAMILY MEDICINE CLINIC | Facility: CLINIC | Age: 31
End: 2021-09-10

## 2021-09-10 LAB
LABCORP SARS-COV-2, NAA 2 DAY TAT: NORMAL
SARS-COV-2 RNA RESP QL NAA+PROBE: NOT DETECTED

## 2021-09-10 NOTE — TELEPHONE ENCOUNTER
----- Message from Farhat Cohn MD sent at 9/10/2021  8:42 AM EDT -----  Let him know his COVID-19 test was negative

## 2021-10-08 ENCOUNTER — OFFICE VISIT (OUTPATIENT)
Dept: FAMILY MEDICINE CLINIC | Facility: CLINIC | Age: 31
End: 2021-10-08

## 2021-10-08 VITALS
BODY MASS INDEX: 28.67 KG/M2 | OXYGEN SATURATION: 98 % | HEIGHT: 71 IN | RESPIRATION RATE: 18 BRPM | TEMPERATURE: 98.2 F | WEIGHT: 204.8 LBS | SYSTOLIC BLOOD PRESSURE: 140 MMHG | HEART RATE: 85 BPM | DIASTOLIC BLOOD PRESSURE: 70 MMHG

## 2021-10-08 DIAGNOSIS — R53.81 MALAISE AND FATIGUE: ICD-10-CM

## 2021-10-08 DIAGNOSIS — I10 ESSENTIAL HYPERTENSION: Primary | ICD-10-CM

## 2021-10-08 DIAGNOSIS — R53.83 MALAISE AND FATIGUE: ICD-10-CM

## 2021-10-08 PROCEDURE — 99213 OFFICE O/P EST LOW 20 MIN: CPT | Performed by: FAMILY MEDICINE

## 2021-10-08 RX ORDER — LISINOPRIL AND HYDROCHLOROTHIAZIDE 25; 20 MG/1; MG/1
1 TABLET ORAL DAILY
Qty: 90 TABLET | Refills: 2 | Status: SHIPPED | OUTPATIENT
Start: 2021-10-08

## 2021-10-08 NOTE — PROGRESS NOTES
"Chief Complaint  Hypertension    Subjective    History of Present Illness        Sheldon Thapa presents to [facility] for   Hypertension  This is a new problem. The current episode started today. The problem is unchanged. The problem is controlled. Pertinent negatives include no anxiety, blurred vision, chest pain, headaches, malaise/fatigue, neck pain, orthopnea, palpitations, peripheral edema, PND, shortness of breath or sweats. (The patient is here today and he states that he has been checking his pressures at home. He states that when he checks it he states that they re the mid's 120/86 and he states that he has had some higher pressures. He states that he has been feeling fine. ) There are no associated agents to hypertension. Risk factors for coronary artery disease include family history, male gender and stress. Past treatments include nothing. Current antihypertension treatment includes nothing. The current treatment provides no improvement. There are no compliance problems.  There is no history of angina, kidney disease, CAD/MI, CVA, heart failure, left ventricular hypertrophy, PVD or retinopathy. There is no history of chronic renal disease, coarctation of the aorta, hyperaldosteronism, hypercortisolism, hyperparathyroidism, a hypertension causing med, pheochromocytoma, renovascular disease, sleep apnea or a thyroid problem.        Objective   Vital Signs:   Visit Vitals  /70   Pulse 85   Temp 98.2 °F (36.8 °C)   Resp 18   Ht 179.1 cm (70.5\")   Wt 92.9 kg (204 lb 12.8 oz)   SpO2 98%   BMI 28.97 kg/m²       Physical Exam  Vitals reviewed.   Constitutional:       Appearance: He is well-developed.   HENT:      Head: Normocephalic.      Right Ear: External ear normal.      Left Ear: External ear normal.      Nose: Nose normal.   Eyes:      Conjunctiva/sclera: Conjunctivae normal.   Cardiovascular:      Rate and Rhythm: Normal rate and regular rhythm.   Pulmonary:      " Effort: Pulmonary effort is normal.      Breath sounds: Normal breath sounds.   Musculoskeletal:         General: Normal range of motion.      Cervical back: Normal range of motion and neck supple.   Skin:     General: Skin is warm and dry.      Capillary Refill: Capillary refill takes less than 2 seconds.   Neurological:      Mental Status: He is alert and oriented to person, place, and time.            Result Review :                    Assessment and Plan      Diagnoses and all orders for this visit:    1. Essential hypertension (Primary)  Assessment & Plan:  Hypertension is improving with treatment.  Continue current treatment regimen.  Dietary sodium restriction.  Weight loss.  Regular aerobic exercise.  Blood pressure will be reassessed at the next regular appointment.    Orders:  -     lisinopril-hydrochlorothiazide (PRINZIDE,ZESTORETIC) 20-25 MG per tablet; Take 1 tablet by mouth Daily.  Dispense: 90 tablet; Refill: 2  -     CBC & Differential  -     Comprehensive Metabolic Panel  -     TSH  -     Lipid Panel With / Chol / HDL Ratio    2. Malaise and fatigue  -     Testosterone (Free & Total), LC / MS  -     Vitamin B12  -     Vitamin D 25 Hydroxy           Follow Up   No follow-ups on file.  Patient was given instructions and counseling regarding his condition or for health maintenance advice. Please see specific information pulled into the AVS if appropriate.

## 2021-10-09 LAB
25(OH)D3+25(OH)D2 SERPL-MCNC: 52 NG/ML (ref 30–100)
ALBUMIN SERPL-MCNC: 4.4 G/DL (ref 4–5)
ALBUMIN/GLOB SERPL: 1.5 {RATIO} (ref 1.2–2.2)
ALP SERPL-CCNC: 42 IU/L (ref 44–121)
ALT SERPL-CCNC: 15 IU/L (ref 0–44)
AST SERPL-CCNC: 18 IU/L (ref 0–40)
BASOPHILS # BLD AUTO: 0 X10E3/UL (ref 0–0.2)
BASOPHILS NFR BLD AUTO: 1 %
BILIRUB SERPL-MCNC: 0.4 MG/DL (ref 0–1.2)
BUN SERPL-MCNC: 7 MG/DL (ref 6–20)
BUN/CREAT SERPL: 7 (ref 9–20)
CALCIUM SERPL-MCNC: 9.9 MG/DL (ref 8.7–10.2)
CHLORIDE SERPL-SCNC: 103 MMOL/L (ref 96–106)
CHOLEST SERPL-MCNC: 186 MG/DL (ref 100–199)
CHOLEST/HDLC SERPL: 3.6 RATIO (ref 0–5)
CO2 SERPL-SCNC: 22 MMOL/L (ref 20–29)
CREAT SERPL-MCNC: 0.99 MG/DL (ref 0.76–1.27)
EOSINOPHIL # BLD AUTO: 0.1 X10E3/UL (ref 0–0.4)
EOSINOPHIL NFR BLD AUTO: 2 %
ERYTHROCYTE [DISTWIDTH] IN BLOOD BY AUTOMATED COUNT: 12.9 % (ref 11.6–15.4)
GLOBULIN SER CALC-MCNC: 2.9 G/DL (ref 1.5–4.5)
GLUCOSE SERPL-MCNC: 75 MG/DL (ref 65–99)
HCT VFR BLD AUTO: 44.9 % (ref 37.5–51)
HDLC SERPL-MCNC: 51 MG/DL
HGB BLD-MCNC: 15.3 G/DL (ref 13–17.7)
IMM GRANULOCYTES # BLD AUTO: 0 X10E3/UL (ref 0–0.1)
IMM GRANULOCYTES NFR BLD AUTO: 0 %
LDLC SERPL CALC-MCNC: 102 MG/DL (ref 0–99)
LYMPHOCYTES # BLD AUTO: 1.7 X10E3/UL (ref 0.7–3.1)
LYMPHOCYTES NFR BLD AUTO: 30 %
MCH RBC QN AUTO: 29 PG (ref 26.6–33)
MCHC RBC AUTO-ENTMCNC: 34.1 G/DL (ref 31.5–35.7)
MCV RBC AUTO: 85 FL (ref 79–97)
MONOCYTES # BLD AUTO: 0.4 X10E3/UL (ref 0.1–0.9)
MONOCYTES NFR BLD AUTO: 8 %
NEUTROPHILS # BLD AUTO: 3.2 X10E3/UL (ref 1.4–7)
NEUTROPHILS NFR BLD AUTO: 59 %
PLATELET # BLD AUTO: 257 X10E3/UL (ref 150–450)
POTASSIUM SERPL-SCNC: 4.8 MMOL/L (ref 3.5–5.2)
PROT SERPL-MCNC: 7.3 G/DL (ref 6–8.5)
RBC # BLD AUTO: 5.27 X10E6/UL (ref 4.14–5.8)
SODIUM SERPL-SCNC: 141 MMOL/L (ref 134–144)
TRIGL SERPL-MCNC: 194 MG/DL (ref 0–149)
TSH SERPL DL<=0.005 MIU/L-ACNC: 1.33 UIU/ML (ref 0.45–4.5)
VIT B12 SERPL-MCNC: 270 PG/ML (ref 232–1245)
VLDLC SERPL CALC-MCNC: 33 MG/DL (ref 5–40)
WBC # BLD AUTO: 5.5 X10E3/UL (ref 3.4–10.8)

## 2021-10-14 LAB
TESTOST FREE SERPL-MCNC: 13 PG/ML (ref 8.7–25.1)
TESTOST SERPL-MCNC: 446.6 NG/DL (ref 264–916)

## 2022-06-29 ENCOUNTER — TRANSCRIBE ORDERS (OUTPATIENT)
Dept: ADMINISTRATIVE | Facility: HOSPITAL | Age: 32
End: 2022-06-29

## 2022-06-29 DIAGNOSIS — M75.101 ROTATOR CUFF SYNDROME OF RIGHT SHOULDER: Primary | ICD-10-CM

## 2022-07-12 ENCOUNTER — HOSPITAL ENCOUNTER (OUTPATIENT)
Dept: MRI IMAGING | Facility: HOSPITAL | Age: 32
Discharge: HOME | End: 2022-07-12
Admitting: EMERGENCY MEDICINE

## 2022-07-12 DIAGNOSIS — M75.101 ROTATOR CUFF SYNDROME OF RIGHT SHOULDER: ICD-10-CM

## 2022-07-12 PROCEDURE — 73221 MRI JOINT UPR EXTREM W/O DYE: CPT
